# Patient Record
Sex: FEMALE | Race: BLACK OR AFRICAN AMERICAN | NOT HISPANIC OR LATINO | Employment: FULL TIME | URBAN - METROPOLITAN AREA
[De-identification: names, ages, dates, MRNs, and addresses within clinical notes are randomized per-mention and may not be internally consistent; named-entity substitution may affect disease eponyms.]

---

## 2019-03-10 ENCOUNTER — HOSPITAL ENCOUNTER (EMERGENCY)
Facility: OTHER | Age: 24
Discharge: HOME | End: 2019-03-10
Attending: EMERGENCY MEDICINE
Payer: MEDICAID

## 2019-03-10 VITALS
OXYGEN SATURATION: 96 % | DIASTOLIC BLOOD PRESSURE: 79 MMHG | WEIGHT: 194 LBS | SYSTOLIC BLOOD PRESSURE: 131 MMHG | BODY MASS INDEX: 33.12 KG/M2 | HEIGHT: 64 IN | TEMPERATURE: 100 F | HEART RATE: 92 BPM | RESPIRATION RATE: 18 BRPM

## 2019-03-10 DIAGNOSIS — J02.0 STREP PHARYNGITIS: Primary | ICD-10-CM

## 2019-03-10 LAB
B-HCG UR QL: NEGATIVE
CTP QC/QA: YES
DEPRECATED S PYO AG THROAT QL EIA: POSITIVE

## 2019-03-10 PROCEDURE — 63600175 PHARM REV CODE 636 W HCPCS: Mod: JG | Performed by: PHYSICIAN ASSISTANT

## 2019-03-10 PROCEDURE — 99284 EMERGENCY DEPT VISIT MOD MDM: CPT | Mod: 25

## 2019-03-10 PROCEDURE — 81025 URINE PREGNANCY TEST: CPT | Performed by: EMERGENCY MEDICINE

## 2019-03-10 PROCEDURE — 96372 THER/PROPH/DIAG INJ SC/IM: CPT

## 2019-03-10 PROCEDURE — 25000003 PHARM REV CODE 250: Performed by: PHYSICIAN ASSISTANT

## 2019-03-10 PROCEDURE — 87880 STREP A ASSAY W/OPTIC: CPT

## 2019-03-10 RX ORDER — IBUPROFEN 800 MG/1
800 TABLET ORAL EVERY 6 HOURS PRN
Qty: 20 TABLET | Refills: 0 | OUTPATIENT
Start: 2019-03-10 | End: 2023-01-14

## 2019-03-10 RX ORDER — IBUPROFEN 400 MG/1
800 TABLET ORAL
Status: COMPLETED | OUTPATIENT
Start: 2019-03-10 | End: 2019-03-10

## 2019-03-10 RX ADMIN — PENICILLIN G BENZATHINE 1.2 MILLION UNITS: 1200000 INJECTION, SUSPENSION INTRAMUSCULAR at 12:03

## 2019-03-10 RX ADMIN — IBUPROFEN 800 MG: 400 TABLET, FILM COATED ORAL at 11:03

## 2019-03-10 NOTE — ED TRIAGE NOTES
"Pt presents with c/o sore throat onset yesterday with body aches and congestion. Pt reports feeling "hot and cold." Pt reports no PMH and denies use of medications. Pt AAox4, RR even and unlabored, NAD noted.  "

## 2019-03-10 NOTE — ED PROVIDER NOTES
Encounter Date: 3/10/2019       History     Chief Complaint   Patient presents with    Sore Throat     Pt reports + sore throat x 2 days. Denies fever or chills. Pt reporst using OTC cough drops.      Patient is a 23 y.o. female presenting to the emergency department with complaints of sore throat.  The patient states her symptoms started yesterday with waking.  She reports she also has nasal congestion rhinorrhea. She denies any fever chills. She does state that her sister was recently diagnosed with strep throat. She states she has tried Katt-[place] and halls with no improvement.  Last dose of halls was this morning, Katt-[place] this morning.This is the extent of the patient's complaints at this time.         The history is provided by the patient.     Review of patient's allergies indicates:  No Known Allergies  History reviewed. No pertinent past medical history.  History reviewed. No pertinent surgical history.  History reviewed. No pertinent family history.  Social History     Tobacco Use    Smoking status: Never Smoker   Substance Use Topics    Alcohol use: Yes     Comment: socially    Drug use: No     Review of Systems   Constitutional: Negative for activity change, appetite change, chills, fatigue and fever.   HENT: Positive for congestion, rhinorrhea and sore throat.    Eyes: Negative for photophobia and visual disturbance.   Respiratory: Negative for cough, shortness of breath and wheezing.    Cardiovascular: Negative for chest pain.   Gastrointestinal: Negative for abdominal pain, diarrhea, nausea and vomiting.   Genitourinary: Negative for dysuria, hematuria and urgency.   Musculoskeletal: Negative for back pain, myalgias and neck pain.   Skin: Negative for color change and wound.   Neurological: Negative for weakness and headaches.   Psychiatric/Behavioral: Negative for agitation and confusion.       Physical Exam     Initial Vitals [03/10/19 1057]   BP Pulse Resp Temp SpO2   (!) 145/84 108 16  99.8 °F (37.7 °C) 100 %      MAP       --         Physical Exam    Nursing note and vitals reviewed.  Constitutional: She appears well-developed and well-nourished. She is not diaphoretic. She is cooperative.  Non-toxic appearance. She does not have a sickly appearance. She does not appear ill. No distress.   Well-appearing, obese,  female accompanied by male in the emergency room.  Speaking clearly in full sentences.  No acute distress.   HENT:   Head: Normocephalic and atraumatic.   Right Ear: Hearing, tympanic membrane, external ear and ear canal normal.   Left Ear: Hearing, tympanic membrane, external ear and ear canal normal.   Nose: Mucosal edema present.   Mouth/Throat: Uvula is midline and mucous membranes are normal. No trismus in the jaw. No uvula swelling. Posterior oropharyngeal edema and posterior oropharyngeal erythema present. No tonsillar abscesses.   Swallowing handling oral secretions difficulty.   Eyes: Conjunctivae and EOM are normal.   Neck: Normal range of motion. Neck supple.   Cardiovascular: Normal rate, regular rhythm and normal heart sounds.   Pulmonary/Chest: Breath sounds normal. No respiratory distress. She has no wheezes.   Musculoskeletal: Normal range of motion.   Neurological: She is alert and oriented to person, place, and time. GCS eye subscore is 4. GCS verbal subscore is 5. GCS motor subscore is 6.   Skin: Skin is warm.   Psychiatric: She has a normal mood and affect. Her behavior is normal. Judgment and thought content normal.         ED Course   Procedures  Labs Reviewed   THROAT SCREEN, RAPID - Abnormal; Notable for the following components:       Result Value    Rapid Strep A Screen Positive (*)     All other components within normal limits   POCT URINE PREGNANCY           Medical Decision Making:   Initial Assessment:   Urgent evaluation a 23-year-old female presenting to the emergency department with complaints of sore throat.  Patient is afebrile, nontoxic  appearing, hemodynamically stable. Physical exam reveals posterior oropharyngeal erythema and edema with no exudate.  Swallowing handling oral secretions without difficulty. The patient has erythema without tonsillar exudate. There is no uvula deviation to suggest peritonsillar abscess. The patient has normal phonation with no trismus to suggest retropharyngeal abscess. There is no hoarseness, difficulty handling secretions, or facial swelling to suggest peritonsillar abscess, retropharyngeal abscess, epiglottitis, or airway compromise.  Will obtain rapid strep, given analgesics and reassess.    Clinical Tests:   Lab Tests: Ordered and Reviewed  ED Management:  UPT is negative. Rapid strep is positive.  Patient is given Bicillin in the emergency room.  Discharged home with a prescription for ibuprofen.  Counseled symptomatic care and treatment.  Discharged home in stable condition.The patient was instructed to follow up with a primary care provider in 2 days or to return to the emergency department for worsening symptoms. The treatment plan was discussed with the patient who demonstrated understanding and comfort with plan.    This note was created using [employer] Fluency Direct. There may be typographical errors secondary to dictation.                         Clinical Impression:     1. Strep pharyngitis           Disposition:   Disposition: Discharged  Condition: Stable                        Bessy Russo PA-C  03/10/19 1147

## 2021-10-26 ENCOUNTER — NURSE TRIAGE (OUTPATIENT)
Dept: ADMINISTRATIVE | Facility: CLINIC | Age: 26
End: 2021-10-26
Payer: MEDICAID

## 2023-01-14 ENCOUNTER — HOSPITAL ENCOUNTER (EMERGENCY)
Facility: OTHER | Age: 28
Discharge: HOME | End: 2023-01-14
Attending: EMERGENCY MEDICINE
Payer: MEDICAID

## 2023-01-14 VITALS
HEART RATE: 85 BPM | HEIGHT: 64 IN | BODY MASS INDEX: 37.22 KG/M2 | SYSTOLIC BLOOD PRESSURE: 143 MMHG | OXYGEN SATURATION: 98 % | DIASTOLIC BLOOD PRESSURE: 77 MMHG | WEIGHT: 218 LBS | RESPIRATION RATE: 17 BRPM | TEMPERATURE: 97 F

## 2023-01-14 DIAGNOSIS — K12.2 UVULITIS: Primary | ICD-10-CM

## 2023-01-14 DIAGNOSIS — S46.812A STRAIN OF LEFT TRAPEZIUS MUSCLE, INITIAL ENCOUNTER: ICD-10-CM

## 2023-01-14 LAB
B-HCG UR QL: NEGATIVE
CTP QC/QA: YES
GROUP A STREP, MOLECULAR: NEGATIVE

## 2023-01-14 PROCEDURE — 63600175 PHARM REV CODE 636 W HCPCS: Performed by: NURSE PRACTITIONER

## 2023-01-14 PROCEDURE — 87651 STREP A DNA AMP PROBE: CPT | Performed by: NURSE PRACTITIONER

## 2023-01-14 PROCEDURE — 99284 EMERGENCY DEPT VISIT MOD MDM: CPT

## 2023-01-14 PROCEDURE — 81025 URINE PREGNANCY TEST: CPT | Performed by: PHYSICIAN ASSISTANT

## 2023-01-14 RX ORDER — CETIRIZINE HYDROCHLORIDE 10 MG/1
10 TABLET ORAL DAILY
Qty: 30 TABLET | Refills: 0 | COMMUNITY
Start: 2023-01-14 | End: 2024-01-14

## 2023-01-14 RX ORDER — IBUPROFEN 800 MG/1
800 TABLET ORAL EVERY 6 HOURS PRN
Qty: 20 TABLET | Refills: 0 | Status: SHIPPED | OUTPATIENT
Start: 2023-01-14

## 2023-01-14 RX ORDER — METHOCARBAMOL 500 MG/1
500 TABLET, FILM COATED ORAL 3 TIMES DAILY
Qty: 15 TABLET | Refills: 0 | Status: SHIPPED | OUTPATIENT
Start: 2023-01-14 | End: 2023-01-19

## 2023-01-14 RX ORDER — PREDNISONE 20 MG/1
60 TABLET ORAL
Status: COMPLETED | OUTPATIENT
Start: 2023-01-14 | End: 2023-01-14

## 2023-01-14 RX ORDER — IBUPROFEN 800 MG/1
800 TABLET ORAL EVERY 6 HOURS PRN
Qty: 20 TABLET | Refills: 0 | Status: SHIPPED | OUTPATIENT
Start: 2023-01-14 | End: 2023-01-14 | Stop reason: SDUPTHER

## 2023-01-14 RX ADMIN — PREDNISONE 60 MG: 20 TABLET ORAL at 04:01

## 2023-01-14 NOTE — FIRST PROVIDER EVALUATION
Emergency Department TeleTriage Encounter Note      CHIEF COMPLAINT    Chief Complaint   Patient presents with    Neck Pain     C/o neck pain 7/10 and throat pain x 4 days. -trauma/injury. Denies any other symptoms. VSS       VITAL SIGNS   Initial Vitals [01/14/23 1328]   BP Pulse Resp Temp SpO2   (!) 143/77 85 17 97.4 °F (36.3 °C) 98 %      MAP       --            ALLERGIES    Review of patient's allergies indicates:  No Known Allergies    PROVIDER TRIAGE NOTE  Patient presents with complaint of posterior neck pain for 4 days. Pain worse with movement. No known injury. No fever or chills.        ORDERS  Labs Reviewed - No data to display    ED Orders (720h ago, onward)      None              Virtual Visit Note: The provider triage portion of this emergency department evaluation and documentation was performed via [employer], a HIPAA-compliant telemedicine application, in concert with a tele-presenter in the room. A face to face patient evaluation with one of my colleagues will occur once the patient is placed in an emergency department room.      DISCLAIMER: This note was prepared with M[employer] voice recognition transcription software. Garbled syntax, mangled pronouns, and other bizarre constructions may be attributed to that software system.

## 2023-01-14 NOTE — ED NOTES
Patient presented to ER with c/o neck pain and throat pain since Wednesday. Patient stated she sick a week ago with URI symptoms which has resolved.     LOC: The patient is awake, alert, and oriented to self, place, time, and situation. Pt is calm and cooperative. Affect is appropriate.  Speech is appropriate and clear.     APPEARANCE: Patient resting comfortably in no acute distress.  Patient is clean and well groomed.    SKIN: The skin is warm and dry; color consistent with ethnicity.  Patient has normal skin turgor and moist mucus membranes.  Skin intact; no breakdown or bruising noted.     MUSCULOSKELETAL: Patient moving upper and lower extremities without difficulty; denies pain in the extremities or back.  Denies weakness.     RESPIRATORY: Airway is open and patent. Respirations spontaneous, even, easy, and non-labored.  Patient has a normal effort and rate.  No accessory muscle use noted. Denies cough.     CARDIAC:  Normal rate noted.  No peripheral edema noted. No complaints of chest pain.      ABDOMEN: Soft and non tender to palpation.  No distention noted. Pt denies abdominal pain; denies nausea, vomiting, diarrhea, or constipation.    NEUROLOGIC: Eyes open spontaneously.  Behavior appropriate to situation.  Follows commands; facial expression symmetrical.  Purposeful motor response noted; normal sensation in all extremities. Pt denies headache; denies lightheadedness or dizziness; denies visual disturbances; denies loss of balance; denies unilateral weakness.

## 2023-01-15 NOTE — ED PROVIDER NOTES
Encounter Date: 1/14/2023       History     Chief Complaint   Patient presents with    Neck Pain     C/o neck pain 7/10 and throat pain x 4 days. -trauma/injury. Denies any other symptoms. VSS     26 y/o female which presents with left upper back/neck pain and throat pain that began a few days ago. Denies fevers. Denies injury or trauma.    The history is provided by the patient.   Review of patient's allergies indicates:  No Known Allergies  No past medical history on file.  No past surgical history on file.  No family history on file.  Social History     Tobacco Use    Smoking status: Never   Substance Use Topics    Alcohol use: Yes     Comment: socially    Drug use: No     Review of Systems   Constitutional:  Negative for fever.   HENT:  Positive for sore throat.    Respiratory:  Negative for shortness of breath.    Cardiovascular:  Negative for chest pain.   Gastrointestinal:  Negative for nausea.   Genitourinary:  Negative for dysuria.   Musculoskeletal:  Positive for myalgias and neck pain. Negative for back pain.   Skin:  Negative for rash.   Neurological:  Negative for weakness.   Hematological:  Does not bruise/bleed easily.   All other systems reviewed and are negative.    Physical Exam     Initial Vitals [01/14/23 1328]   BP Pulse Resp Temp SpO2   (!) 143/77 85 17 97.4 °F (36.3 °C) 98 %      MAP       --         Physical Exam    Nursing note and vitals reviewed.  Constitutional: She appears well-developed and well-nourished.   HENT:   Head: Normocephalic and atraumatic.   Mouth/Throat: Uvula is midline, oropharynx is clear and moist and mucous membranes are normal. No trismus in the jaw. Uvula swelling (and elongated) present.   Eyes: Conjunctivae and EOM are normal. Pupils are equal, round, and reactive to light.   Neck: No thyromegaly present.   Normal range of motion.  Cardiovascular:  Normal rate, regular rhythm, normal heart sounds and intact distal pulses.     Exam reveals no gallop and no friction  rub.       No murmur heard.  Pulmonary/Chest: Breath sounds normal. No respiratory distress. She has no wheezes. She has no rhonchi. She has no rales. She exhibits no tenderness.   Abdominal: Abdomen is soft. Bowel sounds are normal. She exhibits no distension and no mass. There is no abdominal tenderness. There is no rebound and no guarding.   Musculoskeletal:         General: Tenderness present. No edema. Normal range of motion.        Arms:       Cervical back: Normal range of motion.     Lymphadenopathy:     She has no cervical adenopathy.   Neurological: She is alert and oriented to person, place, and time. She has normal strength. GCS score is 15. GCS eye subscore is 4. GCS verbal subscore is 5. GCS motor subscore is 6.   Skin: Skin is warm. Capillary refill takes less than 2 seconds.     ED Course   Procedures  Labs Reviewed   GROUP A STREP, MOLECULAR   POCT URINE PREGNANCY           Medications   predniSONE tablet 60 mg (60 mg Oral Given 1/14/23 6396)     Medical Decision Making:   Initial Assessment:   28 y/o female which presents to the ED with throat pain when swallowing and left upper back pain consistent with trapezius muscle strain.  Differential Diagnosis:   Uvulitis, strep pharyngitis, trapezius muscle strain, lymphadenopathy, viral uri  Clinical Tests:   Lab Tests: Ordered and Reviewed  The following lab test(s) were unremarkable: UPT       <> Summary of Lab: Strep negative  ED Management:  Pt examined and appears to have uvulitis along with tenderness to upper left trapezius muscle. Strep negative. PT given a single dose of prednisone in the ED and has been advised to take zyrtec OTC at home for 1-2 weeks. She has been advised to use warm compresses to her sore muscle and to take tylenol or ibuprofen as needed for pain. Patient given strict return precautions and voiced understanding of all discharge instructions. Pt was stable at discharge.                  ED Course as of 01/15/23 1204   Sat Isael  14, 2023   1534 Preg Test, Ur: Negative [AT]   1534 BP(!): 143/77 [AT]   1534 Temp: 97.4 °F (36.3 °C) [AT]   1534 Temp src: Oral [AT]   1534 Pulse: 85 [AT]   1534 Resp: 17 [AT]   1534 SpO2: 98 % [AT]   1658 Group A Strep, Molecular: Negative [AT]      ED Course User Index  [AT] NAHOMY Carvajal                 Clinical Impression:   Final diagnoses:  [K12.2] Uvulitis (Primary)  [S46.812A] Strain of left trapezius muscle, initial encounter        ED Disposition Condition    Discharge Stable          ED Prescriptions       Medication Sig Dispense Start Date End Date Auth. Provider    ibuprofen (ADVIL,MOTRIN) 800 MG tablet  (Status: Discontinued) Take 1 tablet (800 mg total) by mouth every 6 (six) hours as needed for Pain. 20 tablet 1/14/2023 1/14/2023 NAHOMY Carvajal    methocarbamoL (ROBAXIN) 500 MG Tab Take 1 tablet (500 mg total) by mouth 3 (three) times daily. for 5 days 15 tablet 1/14/2023 1/19/2023 NAHOMY Carvajal    cetirizine (ZYRTEC) 10 MG tablet Take 1 tablet (10 mg total) by mouth once daily. 30 tablet 1/14/2023 1/14/2024 NAHOMY Carvajal    ibuprofen (ADVIL,MOTRIN) 800 MG tablet Take 1 tablet (800 mg total) by mouth every 6 (six) hours as needed for Pain. 20 tablet 1/14/2023 -- NAHOMY Carvajal          Follow-up Information       Follow up With Specialties Details Why Contact Info    primary care provider as needed                 NAHOMY Carvajal  01/15/23 3633